# Patient Record
(demographics unavailable — no encounter records)

---

## 2024-10-22 NOTE — HISTORY OF PRESENT ILLNESS
[FreeTextEntry1] : This is a very nice  73 year old  female experiencing  pain in the left knee which is severe in intensity and has been going on for at least 3 months now. The pain limits activities of daily living. Walking tolerance is  reduced. The patient denies any radiation of the pain to the feet and it is not associated with numbness, tingling, or weakness.

## 2024-10-22 NOTE — PHYSICAL EXAM
[de-identified] : Well developed, well nourished in no apparent distress, awake, alert and orientated to person, place and time. with appropriate mood and affect.  Respirations are even and unlabored. Gait evaluation does reveal a limp. There is no inguinal adenopathy.  The affected limb is well-perfused, without skin lesions, shows a grossly normal motor and sensory examination.  Knee motion does cause pain. ROM of the knee is 0-110 degrees.  5 degrees varus The knee is stable within that range-of-motion to AP and ML stress.  Muscle strength is normal. Pedal pulses are palpable.

## 2024-10-22 NOTE — DISCUSSION/SUMMARY
[de-identified] :  This patient has  left knee osteoarthritis. I had a discussion with the patient, who is a candidate for joint replacement. Risks, benefits and alternatives were discussed. At this point, the patient wants to hold off until February. An extensive discussion was conducted on the natural history of the disease and the variety of surgical and non-surgical options available to the patient including, but not limited to non-steroidal anti-inflammatory medications, steroid injections, physical therapy, maintenance of ideal body weight, and reduction of activity. The patient will schedule an appointment as needed.  Informed consent for left knee injection  was obtained.  All risks, benefits and alternatives were discussed. These included but were not limited to bleeding, infection and allergic reaction. All questions were answered. A time out was performed. Left  knee were prepped and draped in sterile fashion. Using sterile technique, 40mg of Kenalog, 4cc of 1% lidocaine, 4cc of 0.25% marcaine using a 21-gauge needle. A sterile dressing was applied. Post injection instructions were reviewed. The patient tolerated the procedure well.   Her left knee is under Worker's Compensation. Follow up in 2-3 months.

## 2024-11-13 NOTE — REVIEW OF SYSTEMS
[Joint Pains] : arthralgias [Joint Swelling] : joint swelling  [Dry Skin] : ~L dry skin [Nl] : Genitourinary [FreeTextEntry4] : see HPI [FreeTextEntry9] : s/p total knee replacement surgery [de-identified] : see HPI

## 2024-11-13 NOTE — PHYSICAL EXAM
[Alert] : alert [Well Nourished] : well nourished [Healthy Appearance] : healthy appearance [No Acute Distress] : no acute distress [Well Developed] : well developed [Normal Pupil & Iris Size/Symmetry] : normal pupil and iris size and symmetry [No Discharge] : no discharge [No Photophobia] : no photophobia [Sclera Not Icteric] : sclera not icteric [Normal TMs] : both tympanic membranes were normal [Normal Nasal Mucosa] : the nasal mucosa was normal [Normal Lips/Tongue] : the lips and tongue were normal [Normal Outer Ear/Nose] : the ears and nose were normal in appearance [Normal Tonsils] : normal tonsils [No Thrush] : no thrush [Pale mucosa] : pale mucosa [Boggy Nasal Turbinates] : boggy and/or pale nasal turbinates [Supple] : the neck was supple [Normal Rate and Effort] : normal respiratory rhythm and effort [No Crackles] : no crackles [No Retractions] : no retractions [Bilateral Audible Breath Sounds] : bilateral audible breath sounds [Normal Rate] : heart rate was normal  [Normal S1, S2] : normal S1 and S2 [No murmur] : no murmur [Regular Rhythm] : with a regular rhythm [Soft] : abdomen soft [Not Tender] : non-tender [Not Distended] : not distended [No HSM] : no hepato-splenomegaly [Normal Cervical Lymph Nodes] : cervical [Skin Intact] : skin intact  [No Rash] : no rash [No Skin Lesions] : no skin lesions [No clubbing] : no clubbing [No Edema] : no edema [No Cyanosis] : no cyanosis [Normal Mood] : mood was normal [Normal Affect] : affect was normal [Alert, Awake, Oriented as Age-Appropriate] : alert, awake, oriented as age appropriate [Conjunctival Erythema] : no conjunctival erythema [Suborbital Bogginess] : no suborbital bogginess (allergic shiners) [Pharyngeal erythema] : no pharyngeal erythema [Posterior Pharyngeal Cobblestoning] : no posterior pharyngeal cobblestoning [Clear Rhinorrhea] : no clear rhinorrhea was seen [Exudate] : no exudate [Wheezing] : no wheezing was heard [Patches] : no patches [Urticaria] : no urticaria

## 2024-11-13 NOTE — CONSULT LETTER
[Dear  ___] : Dear  [unfilled], [Consult Letter:] : I had the pleasure of evaluating your patient, [unfilled]. [Please see my note below.] : Please see my note below. [Consult Closing:] : Thank you very much for allowing me to participate in the care of this patient.  If you have any questions, please do not hesitate to contact me. [Sincerely,] : Sincerely, [FreeTextEntry3] : MD Anu Trinidad MD [facility] [address] [address] phone: [phone] fax: [phone]

## 2024-11-13 NOTE — CONSULT LETTER
[Dear  ___] : Dear  [unfilled], [Consult Letter:] : I had the pleasure of evaluating your patient, [unfilled]. [Please see my note below.] : Please see my note below. [Consult Closing:] : Thank you very much for allowing me to participate in the care of this patient.  If you have any questions, please do not hesitate to contact me. [Sincerely,] : Sincerely, [FreeTextEntry3] : MD Anu Trinidad MD [facility] [address] [address] phone: [phone] fax: [phone]

## 2024-11-13 NOTE — HISTORY OF PRESENT ILLNESS
[de-identified] : 73-year old female presents for drug allergy evaluation to NSAIDs  Interval history 11/6/2024 Seen by dermatologist who thinks her lesions were due to sun exposure, diagnosing her with solar purpura/senile purpura Rheumatologic workup negative for any significant autoimmune disease including SLE Thinks she may have photosensitivity-induced lesions as her lesions have improved with reduced sun exposure after the summer Still has residual darkened wilbreto on the hand of previous lesion site, not itchy, self-resolved Took an Advil yesterday afternoon and an Aleve on 11/2 PM with no further episodes of rash Otherwise no allergic reactions since August Taking Allegra 6/7 days daily with Flonase daily, controlling her non-allergic rhinitis and chronic urticaria symptoms concern for the next surgery - worried about sulfa cross-reactivity of Celebrex with Bactrim given hx of anaphylaxis to Bactrim  ---------------------- Interval history 8/20/2024:  Was having fixed lesions on the L hand, middle of neck and upper L forearm after possible NSAID use. She states that it develops within 3-4 days of exposure, not itchy lesions After the first NSAID meloxicam x 2 weeks developed, then went away Then switched to diclofenac developed the same lesion after 3-4 days  Was in [place] 8/1-8/15 and applied salon pas methylsalicylate on L hip, developed recurrence of the lesion on her hand Started to slowly fade over the course of a week or so Had swollen legs in [place] and had to rent a scooter to get around using Lidocaine patches to help with her symptoms in addition to Tylenol stated she was diagnosed with bone marrow edema of the L Hip and bursitis Ortho recommends surgical intervention and possible hip replacement but due to possible NSAID reactions patient is uncomfortable as she has no good options for analgesia outside of tylenol PCP sent bloodwork for coagulation - normal PT/PTT/INR and no concern for LFT abnormalities CBC was obtained but not part of paperwork brought in by patient - pt was not informed of any abnormalities with WBC, Red cells or platelets  ---------- 01/2024 During her hospitalization in May 2023, the patient developed a rash about an hour after receiving Ketorolac and possibly ASA on 5/25. The rash resolved after treatment with Benadryl, Famotidine and Solumedrol. Worsening and recurrence of the rash as well as swelling of eyelids and lips occurred after administration of ASA, and again recurrence and generalization of the rash, itching of the throat and hoarseness after Tylenol 1000 mg IV. A serum tryptase was then sent which was elevated (15.7).  Ketorolac, ASA avoided. The patient was started on long acting oral antihistamines and Benadryl/Hydroxyzine, Medrol David, discharged home on Allegra 180 qAM and Cetirizine 10 mg QHS. The patient's rash improved, and per patient report resolved by 5/28.   Ancef (Cefazoliin) had been administered twice the day prior to her symptom onset, and considered a less likely culprit, however due to patient concerns, she RTC for skin testing to cefazolin.  The patient baseline tryptase from 6/5/23 was within normal range (3.5 ug/L), normalized from serum tryptase of 15.7 ug/L on 5/25 collected within 2 hours of symptoms of itching of the throat, hoarseness of the voice, and recurrence of diffuse urticaria during her hospitalization, most suggestive of a systemic allergic reaction. Patient had received Tylenol 1000 mg IV 2 hours prior, and ASA 5 hours prior.  Further laboratory testing from 6/5/23 was also remarkable for elevated anti-IgE receptor Ab suggestive of underlying chronic urticaria. Most recently patient had hives on 2 subsequent days in the setting of influenza infection 2 weeks ago.  Since then, the patient had passed supervised oral challenges to Ibuprofen 400 mg and Meloxicam 15 mg in our office. Skin prick and intradermal testing 7/24/23 was negative to Chlorhexidine.  She has h/o hives and swelling after taking Bactrim several decades ago, which has been avoided since then. After mole removal on ear lobe and eyelid, and application of Bacitracin, the patient developed swelling, several decades ago.

## 2024-11-13 NOTE — DATA REVIEWED
[FreeTextEntry1] : I reviewed external bloodwork from Dr. Navarro from 7/10/2024 PT - 10.2 sec INR - 0.89 PTT - 29.3 sec AST - 16U/L ALT - 21 U/L AlkP - 83 U/L

## 2024-11-13 NOTE — HISTORY OF PRESENT ILLNESS
[de-identified] : 73-year old female presents for drug allergy evaluation to NSAIDs  Interval history 11/6/2024 Seen by dermatologist who thinks her lesions were due to sun exposure, diagnosing her with solar purpura/senile purpura Rheumatologic workup negative for any significant autoimmune disease including SLE Thinks she may have photosensitivity-induced lesions as her lesions have improved with reduced sun exposure after the summer Still has residual darkened wilberto on the hand of previous lesion site, not itchy, self-resolved Took an Advil yesterday afternoon and an Aleve on 11/2 PM with no further episodes of rash Otherwise no allergic reactions since August Taking Allegra 6/7 days daily with Flonase daily, controlling her non-allergic rhinitis and chronic urticaria symptoms concern for the next surgery - worried about sulfa cross-reactivity of Celebrex with Bactrim given hx of anaphylaxis to Bactrim  ---------------------- Interval history 8/20/2024:  Was having fixed lesions on the L hand, middle of neck and upper L forearm after possible NSAID use. She states that it develops within 3-4 days of exposure, not itchy lesions After the first NSAID meloxicam x 2 weeks developed, then went away Then switched to diclofenac developed the same lesion after 3-4 days  Was in [place] 8/1-8/15 and applied salon pas methylsalicylate on L hip, developed recurrence of the lesion on her hand Started to slowly fade over the course of a week or so Had swollen legs in [place] and had to rent a scooter to get around using Lidocaine patches to help with her symptoms in addition to Tylenol stated she was diagnosed with bone marrow edema of the L Hip and bursitis Ortho recommends surgical intervention and possible hip replacement but due to possible NSAID reactions patient is uncomfortable as she has no good options for analgesia outside of tylenol PCP sent bloodwork for coagulation - normal PT/PTT/INR and no concern for LFT abnormalities CBC was obtained but not part of paperwork brought in by patient - pt was not informed of any abnormalities with WBC, Red cells or platelets  ---------- 01/2024 During her hospitalization in May 2023, the patient developed a rash about an hour after receiving Ketorolac and possibly ASA on 5/25. The rash resolved after treatment with Benadryl, Famotidine and Solumedrol. Worsening and recurrence of the rash as well as swelling of eyelids and lips occurred after administration of ASA, and again recurrence and generalization of the rash, itching of the throat and hoarseness after Tylenol 1000 mg IV. A serum tryptase was then sent which was elevated (15.7).  Ketorolac, ASA avoided. The patient was started on long acting oral antihistamines and Benadryl/Hydroxyzine, Medrol David, discharged home on Allegra 180 qAM and Cetirizine 10 mg QHS. The patient's rash improved, and per patient report resolved by 5/28.   Ancef (Cefazoliin) had been administered twice the day prior to her symptom onset, and considered a less likely culprit, however due to patient concerns, she RTC for skin testing to cefazolin.  The patient baseline tryptase from 6/5/23 was within normal range (3.5 ug/L), normalized from serum tryptase of 15.7 ug/L on 5/25 collected within 2 hours of symptoms of itching of the throat, hoarseness of the voice, and recurrence of diffuse urticaria during her hospitalization, most suggestive of a systemic allergic reaction. Patient had received Tylenol 1000 mg IV 2 hours prior, and ASA 5 hours prior.  Further laboratory testing from 6/5/23 was also remarkable for elevated anti-IgE receptor Ab suggestive of underlying chronic urticaria. Most recently patient had hives on 2 subsequent days in the setting of influenza infection 2 weeks ago.  Since then, the patient had passed supervised oral challenges to Ibuprofen 400 mg and Meloxicam 15 mg in our office. Skin prick and intradermal testing 7/24/23 was negative to Chlorhexidine.  She has h/o hives and swelling after taking Bactrim several decades ago, which has been avoided since then. After mole removal on ear lobe and eyelid, and application of Bacitracin, the patient developed swelling, several decades ago.

## 2024-11-13 NOTE — REVIEW OF SYSTEMS
[Joint Pains] : arthralgias [Joint Swelling] : joint swelling  [Dry Skin] : ~L dry skin [Nl] : Genitourinary [FreeTextEntry4] : see HPI [FreeTextEntry9] : s/p total knee replacement surgery [de-identified] : see HPI

## 2024-11-19 NOTE — DISCUSSION/SUMMARY
[de-identified] :  This patient has severe left knee osteoarthritis.  She has tried and failed a course of conservative management and is now considering proceeding with a left total knee arthroplasty using robotic navigation for assistance.  The patient is an appropriate candidate for consideration of left total knee replacement. An extensive discussion was conducted of the natural history of the disease and the variety of surgical and non-surgical treatment options available to the patient. A risk/benefit analysis was discussed with the patient reviewing the advantages and disadvantages of surgical intervention at this time. Both the level and length of the patient's pain have made additional conservative treatment measures consisting of NSAIDs, physical therapy, corticosteroids, and/or viscosupplementation contraindicated. A full explanation was given of the nature and the purpose of the procedure and anesthesia, its benefits, possible alternative methods of diagnosis or treatment, the risks involved, the possibility of complications, the foreseeable consequences of the procedure and the possible results of the non-treatment. I reviewed the plan of care as well as used a model of a total knee implant equivalent to the one that will be used for their total knee joint replacement. The ability to secure the implant utilizing cement or cementless (press-fit) was discussed with the patient. The patient agrees with the plan of care, as well as the use of implants for their total knee replacement.   We also discussed that if robotic/computer navigation is utilized, then additional incisions may need to be made to accommodate the computer navigation arrays, which will be placed in the femur and tibia.  No guarantee or assurance was made as to the results that may be obtained. Specifically, the risks were identified to include, but are not limited to the following: Infection, phlebitis, pulmonary embolism, death, paralysis, dislocation, pain, stiffness, instability, limp, weakness, breakage, leg-length inequality, uncontrolled bleeding, nerve injury, blood vessel injury, pressure sores, anesthetic risks, delayed healing of wound and bone, and wear and loosening. Further discussion was undertaken with the patient about the details of surgical preparation, treatment, and postoperative rehabilitation including medical clearance, autotransfusion, the hospital course, and the postoperative rehabilitation involved. All in all, I feel that this patient is a good candidate for surgical reconstruction.

## 2024-11-19 NOTE — HISTORY OF PRESENT ILLNESS
[FreeTextEntry1] : This is a very nice  73 year old  female experiencing  pain in the left knee which is severe in intensity and has been going on for at least 6 months now. The pain limits activities of daily living. Walking tolerance is  reduced. She has tried cortisone injections in the past. The patient denies any radiation of the pain to the feet and it is not associated with numbness, tingling, or weakness.

## 2024-11-19 NOTE — PHYSICAL EXAM
[de-identified] : Well developed, well nourished in no apparent distress, awake, alert and orientated to person, place and time. with appropriate mood and affect.  Respirations are even and unlabored. Gait evaluation does reveal a limp. There is no inguinal adenopathy.  The affected limb is well-perfused, without skin lesions, shows a grossly normal motor and sensory examination.  Knee motion does cause pain. ROM of the knee is 0-105 degrees.  5 degrees varus The knee is stable within that range-of-motion to AP and ML stress.  Muscle strength is normal. Pedal pulses are palpable. [de-identified] : Long standing knee, AP knee, lateral knee, and patellar views of the left knee were ordered and taken in the office and demonstrate degenerative joint disease of the knee with joint space narrowing, osteophyte formation, and subchondral sclerosis.

## 2024-12-13 NOTE — HISTORY OF PRESENT ILLNESS
[FreeTextEntry1] : She has a hx. of HTN.   Echo in Jan. 2023 showed only minimal MR and mild diastolic LV dysfunction; LV EF was 72%.  Her father suffered from premature CAD with an initial MI at age 40.   In 2023, she had a knee replacement.  She developed a rash approx. 1 hour after receiving ketorolac for pain in the recovery room post-op.  The rash appeared to worsen and was associated with swelling of the eyelids and lips after administration of aspirin.  She was seen by allergy and immunology and treated with Benadryl, hydroxyzine and a Medrol Dosepak; she was discharged on Allegra and cetirizine.  Her rash improved over time.  She saw the allergy and immunology service in follow-up; it was thought likely that NSAIDs/aspirin was the cause of the allergic reaction.  She was also told to avoid cephalosporins for concern of a possible delayed hypersensitivity reaction, as a dose of cefazolin had been given 2 days prior to the allergic reaction.  She continues to follow-up with the allergy and immunology service.  As she returns today, she continues to have orthopedic problems.  In addition to her knee, she is having considerable hip pain.  After discussion with two orthopedists, she will likely need to have the hip replaced before having the knee replaced.  In August, she travel to [place] with her daughter.  The weather was very hot.  She recalls 1 specific instance, when walking in the heat when she experienced a anterior squeezing chest discomfort, which resolved when she had a chance to rest and a cool place.  She does not describe recurrence, but her daughter says it happened more than once while they were away.  However, since returning home, she tells me that she has remained asymptomatic.  She does not describe exertional dyspnea or palpitations.

## 2024-12-13 NOTE — ASSESSMENT
[FreeTextEntry1] : =================================== Hypertension  Borderline HLD  Familial heart dz. with premature CAD in father  Osteoarthritis of both knees and of the lumbo-sacral spine  Allergy to NSAIDs/ASA; hx. of allergy to Bactrim. Possible allergy to cephalosporins.

## 2024-12-13 NOTE — PHYSICAL EXAM
[General Appearance - Well Developed] : well developed [Normal Appearance] : normal appearance [Well Groomed] : well groomed [General Appearance - Well Nourished] : well nourished [General Appearance - In No Acute Distress] : no acute distress [Normal Conjunctiva] : the conjunctiva exhibited no abnormalities [Eyelids - No Xanthelasma] : the eyelids demonstrated no xanthelasmas [Normal Jugular Venous A Waves Present] : normal jugular venous A waves present [Normal Jugular Venous V Waves Present] : normal jugular venous V waves present [No Jugular Venous Gautam A Waves] : no jugular venous gautam A waves [] : no respiratory distress [Respiration, Rhythm And Depth] : normal respiratory rhythm and effort [Auscultation Breath Sounds / Voice Sounds] : lungs were clear to auscultation bilaterally [Heart Rate And Rhythm] : heart rate and rhythm were normal [Bowel Sounds] : normal bowel sounds [Abnormal Walk] : normal gait [Nail Clubbing] : no clubbing of the fingernails [Cyanosis, Localized] : no localized cyanosis [Skin Color & Pigmentation] : normal skin color and pigmentation [Skin Turgor] : normal skin turgor [Oriented To Time, Place, And Person] : oriented to person, place, and time [Impaired Insight] : insight and judgment were intact [Affect] : the affect was normal [Mood] : the mood was normal [FreeTextEntry1] : Spider varicosities of the lower extremities.

## 2024-12-13 NOTE — HISTORY OF PRESENT ILLNESS
[FreeTextEntry1] : She has a hx. of HTN.   Echo in Jan. 2023 showed only minimal MR and mild diastolic LV dysfunction; LV EF was 72%.  Her father suffered from premature CAD with an initial MI at age 40.   In 2023, she had a knee replacement.  She developed a rash approx. 1 hour after receiving ketorolac for pain in the recovery room post-op.  The rash appeared to worsen and was associated with swelling of the eyelids and lips after administration of aspirin.  She was seen by allergy and immunology and treated with Benadryl, hydroxyzine and a Medrol Dosepak; she was discharged on Allegra and cetirizine.  Her rash improved over time.  She saw the allergy and immunology service in follow-up; it was thought likely that NSAIDs/aspirin was the cause of the allergic reaction.  She was also told to avoid cephalosporins for concern of a possible delayed hypersensitivity reaction, as a dose of cefazolin had been given 2 days prior to the allergic reaction.  She continues to follow-up with the allergy and immunology service.  As she returns today, she continues to have orthopedic problems.  In addition to her knee, she is having considerable hip pain.  After discussion with two orthopedists, she will likely need to have the hip replaced before having the knee replaced.  In August, she travel to [place] with her daughter.  The weather was very hot.  She recalls 1 specific instance, when walking in the heat when she experienced a anterior squeezing chest discomfort, which resolved when she had a chance to rest and a cool place.  She does not describe recurrence, but her daughter says it happened more than once while they were away.  However, since returning home, she tells me that she has remained asymptomatic.  She does not describe exertional dyspnea or palpitations.

## 2024-12-13 NOTE — DISCUSSION/SUMMARY
[EKG obtained to assist in diagnosis and management of assessed problem(s)] : EKG obtained to assist in diagnosis and management of assessed problem(s) [FreeTextEntry1] : EKG today shows:  Sinus Rhythm at 92 bpm.  Normal intervals and axis.  Unremarkable tracing; no change.  PLAN: 1.  HTN  -blood pressure remains in acceptable range today. - continue enalapril   - continue low salt diet.  2.  Borderline HLD - continue low fat diet.  3.  Episodes of CP last August, no apparent recurrence. -   Given risk factors for heart disease, notably premature heart disease in her father, we will arrange for ischemic testing particular given need for orthopedic surgery in the foreseeable future.  Will consider either cardiac CT angiography or potentially pharmacologic nuclear stress testing.  33 minutes spent on today's office visit.   She will return in 4 mos.

## 2025-01-07 NOTE — HISTORY OF PRESENT ILLNESS
[de-identified] : This is very nice 73-year-old female experiencing left hip and groin and thigh pain, which is severe in intensity.  Known left hip osteoarthritis.  The pain substantially limits activities of daily living. Walking tolerance is reduced. Medication including intra-articular hip cortisone injection and physical therapy and activity modification have been minimally effective for a period lasting greater than three months in duration. Assistive devices and external support were not deemed by the patient to be helpful in improving their function. Due to the severity of osteoarthritis and level of pain, physical therapy is contraindicated. Pain and restriction of function are intolerable at this time. The patient denies any radiation of the pain to the feet and it is not associated with numbness, tingling, or weakness.

## 2025-01-07 NOTE — DISCUSSION/SUMMARY
[de-identified] : This patient is severe left hip osteoarthritis.  She has tried and failed a course of conservative management and would like to proceed with a direct anterior approach left total of arthroplasty.  The patient is an appropriate candidate for consideration of left total hip replacement. An extensive discussion was conducted of the natural history of the disease and the variety of surgical and non-surgical treatment options available to the patient. A risk/benefit analysis was discussed with the patient reviewing the advantages and disadvantages of surgical intervention at this time. Both the level and length of the patient's pain have made additional conservative treatment measures consisting of NSAIDs, physical therapy, and/or corticosteroids contraindicated. A full explanation was given of the nature and the purpose of the procedure and anesthesia, its benefits, possible alternative methods of diagnosis or treatment, the risks involved, the possibility of complications, the foreseeable consequences of the procedure and the possible results of the non-treatment. I reviewed the plan of care as well as used a model of a total hip implant equivalent to the one that will be used for their total hip joint replacement. The ability to secure the implant utilizing cement or cementless (press-fit) was discussed with the patient. The patient agrees with the plan of care, as well as the use of implants for their total hip replacement.   No guarantee or assurance was made as to the results that may be obtained. Specifically, the risks were identified to include, but are not limited to the following: Infection, phlebitis, pulmonary embolism, death, paralysis, dislocation, pain, stiffness, instability, limp, weakness, breakage, leg-length inequality, uncontrolled bleeding, nerve injury, blood vessel injury, pressure sores, anesthetic risks, delayed healing of wound and bone, and wear and loosening. Further discussion was undertaken with the patient about the details of surgical preparation, treatment, and postoperative rehabilitation including medical clearance, autotransfusion, the hospital course, and the postoperative rehabilitation involved. All in all, I feel that this patient is a good candidate for surgical reconstruction.

## 2025-01-07 NOTE — PHYSICAL EXAM
[de-identified] : Patient is well nourished, well-developed, in no acute distress, with appropriate mood and affect. The patient is oriented to time, place, and person. Respirations are even and unlabored. Gait evaluation reveals a limp. There is no inguinal adenopathy. Examination of the contralateral hip shows normal range of motion, strength, no tenderness, and intact skin. The affected limb is well-perfused, shows a grossly normal motor and sensory examination. Examination of the hip shows no skin lesions. Hip motion is reduced and causes pain. FADIR is positive and YAMINI is positive. Stinchfield test is positive. Leg lengths are approximately 1 cm shorter than the left. Both hips are stable and muscle strength is normal. Pedal pulses are palpable. [de-identified] :  AP pelvis, AP and lateral hip radiographs of the left hip were ordered and taken in the office and demonstrate degenerative joint disease of the hip with joint space narrowing, osteophyte formation, and subchondral sclerosis.

## 2025-01-16 NOTE — HISTORY OF PRESENT ILLNESS
[___ Month(s) Ago] : [unfilled] month(s) ago [FreeTextEntry1] : 1/2025: being considered for hip replacement surgery due to OA;  otherwise, she denies other joint pains. Denies joint swelling.

## 2025-01-16 NOTE — ASSESSMENT
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 knee replacement soon too but reports anaphylaxis after the R. knee surgery), chronic urticaria, here for followup of joint pain. Was referred to rheum by her orthopedist due to finding of edema in L. hip. She has hip osteoarthritis and orthopedist recommended hip replacement for the patient.   plan:  Will repeat labs today including ESR, CRP.  At this time I am not concerned for any inflammatory arthritis. She was advised to follow up with orthopedist for her ongoing hip pain especially if labs are unremarkable at today's visit.

## 2025-01-16 NOTE — PHYSICAL EXAM
[General Appearance - Alert] : alert [General Appearance - In No Acute Distress] : in no acute distress [Sclera] : the sclera and conjunctiva were normal [Extraocular Movements] : extraocular movements were intact [Outer Ear] : the ears and nose were normal in appearance [Neck Appearance] : the appearance of the neck was normal [Exaggerated Use Of Accessory Muscles For Inspiration] : no accessory muscle use [Edema] : there was no peripheral edema [Musculoskeletal - Swelling] : no joint swelling seen [FreeTextEntry1] : no joint tenderness or synovitis; OA changes to L. knee; s/p R knee replacement. No hip trochanteric bursitis.  [Skin Color & Pigmentation] : normal skin color and pigmentation [] : no rash [Skin Lesions] : no skin lesions [No Focal Deficits] : no focal deficits [Oriented To Time, Place, And Person] : oriented to person, place, and time [Affect] : the affect was normal [Mood] : the mood was normal

## 2025-02-10 NOTE — HISTORY OF PRESENT ILLNESS
[FreeTextEntry1] : She has a hx. of HTN.   Echo in Jan. 2023 showed only minimal MR and mild diastolic LV dysfunction; LV EF was 72%.  Her father suffered from premature CAD with an initial MI at age 40.  In 2023, she had a knee replacement.  She developed a rash approx. 1 hour after receiving ketorolac for pain in the recovery room post-op.  The rash appeared to worsen and was associated with swelling of the eyelids and lips after administration of aspirin.  She was seen by allergy and immunology and treated with Benadryl, hydroxyzine and a Medrol Dosepak; she was discharged on Allegra and cetirizine.  Her rash improved over time.  She saw the allergy and immunology service in follow-up; it was thought likely that NSAIDs/aspirin was the cause of the allergic reaction.  She was also told to avoid cephalosporins for concern of a possible delayed hypersensitivity reaction, as a dose of cefazolin had been given 2 days prior to the allergic reaction.  She continues to follow-up with the allergy and immunology service.  12/13/24: As she returns today, she continues to have orthopedic problems.  In addition to her knee, she is having considerable hip pain.  After discussion with two orthopedists, she will likely need to have the hip replaced before having the knee replaced. In August, she travelled to [place] with her daughter.  The weather was very hot.  She recalls 1 specific instance, when walking in the heat when she experienced a anterior squeezing chest discomfort, which resolved when she had a chance to rest and a cool place.  She does not describe recurrence, but her daughter says it happened more than once while they were away.  However, since returning home, she tells me that she has remained asymptomatic.  She does not describe any exertional dyspnea or palpitations.  2/10/25: She is scheduled to have hip replacement surgery early next month.  From a cardiac standpoint, she has been well.  With her activities, she reports no exertional chest discomfort and describes no exertional dyspnea.  There have been no palpitations. Cardiac CTA on 1/24/25 showed a calcium score of 54 and minimal CAD with no stenosis. Cardiac CTA on 1/24/25 showed a calcium score of 54 and minimal CAD with no stenosis. Cardiac CTA on 1/24/25 showed a calcium score of 54 and minimal CAD with no stenosis.

## 2025-02-10 NOTE — REASON FOR VISIT
[FreeTextEntry1] :   Hanna Ivanna returns for f/u re HTN and prior to hip replacement in early March.

## 2025-02-10 NOTE — DISCUSSION/SUMMARY
[EKG obtained to assist in diagnosis and management of assessed problem(s)] : EKG obtained to assist in diagnosis and management of assessed problem(s) [FreeTextEntry1] : EKG today shows:  Sinus Rhythm at 81 bpm.  Normal intervals and axis.  Unremarkable tracing; no change.  PLAN: 1.  HTN  - blood pressure in normal range today. - continue enalapril   - continue low salt diet.  2.  Borderline HLD - continue low fat diet. -  discussed benefit of weight loss for improvement of lipid profile..  Atypical CP; cardiac CTA with no significant abnormalities  3.   I reviewed the result of the recent cardiac CT angiogram with her.  The findings were favorable, with a relatively low calcium score and no evidence of any coronary stenoses.  4.   There is no cardiac contraindication to her planned hip replacement surgery.  41 minutes spent on today's office visit.   She will return for a f/u visit in 6 mos.

## 2025-02-10 NOTE — ASSESSMENT
[FreeTextEntry1] : =================================== Hypertension  Familial heart dz. with premature CAD in father  Osteoarthritis of both knees and of the lumbo-sacral spine  Allergy to NSAIDs/ASA; hx. of allergy to Bactrim. Possible allergy to cephalosporins.

## 2025-02-10 NOTE — HISTORY OF PRESENT ILLNESS
[FreeTextEntry1] : She has a hx. of HTN.   Echo in Jan. 2023 showed only minimal MR and mild diastolic LV dysfunction; LV EF was 72%.  Her father suffered from premature CAD with an initial MI at age 40.  In 2023, she had a knee replacement.  She developed a rash approx. 1 hour after receiving ketorolac for pain in the recovery room post-op.  The rash appeared to worsen and was associated with swelling of the eyelids and lips after administration of aspirin.  She was seen by allergy and immunology and treated with Benadryl, hydroxyzine and a Medrol Dosepak; she was discharged on Allegra and cetirizine.  Her rash improved over time.  She saw the allergy and immunology service in follow-up; it was thought likely that NSAIDs/aspirin was the cause of the allergic reaction.  She was also told to avoid cephalosporins for concern of a possible delayed hypersensitivity reaction, as a dose of cefazolin had been given 2 days prior to the allergic reaction.  She continues to follow-up with the allergy and immunology service.  12/13/24: As she returns today, she continues to have orthopedic problems.  In addition to her knee, she is having considerable hip pain.  After discussion with two orthopedists, she will likely need to have the hip replaced before having the knee replaced. In August, she travelled to [place] with her daughter.  The weather was very hot.  She recalls 1 specific instance, when walking in the heat when she experienced a anterior squeezing chest discomfort, which resolved when she had a chance to rest and a cool place.  She does not describe recurrence, but her daughter says it happened more than once while they were away.  However, since returning home, she tells me that she has remained asymptomatic.  She does not describe any exertional dyspnea or palpitations.  2/10/25: She is scheduled to have hip replacement surgery early next month.  From a cardiac standpoint, she has been well.  With her activities, she reports no exertional chest discomfort and describes no exertional dyspnea.  There have been no palpitations. Cardiac CTA on 1/24/25 showed a calcium score of 54 and minimal CAD with no stenosis. Cardiac CTA on 1/24/25 showed a calcium score of 54 and minimal CAD with no stenosis. Cardiac CTA on 1/24/25 showed a calcium score of 54 and minimal CAD with no stenosis.

## 2025-03-21 NOTE — HISTORY OF PRESENT ILLNESS
[de-identified] : Status-post left total hip  arthroplasty here for initial postoperative evaluation. Excellent progress is noted in terms of pain and restoration of function. Pain is well controlled with oral medications. There has been no change in medical health since discharge. The patient does require assistive devices.

## 2025-03-21 NOTE — PHYSICAL EXAM
[de-identified] : Well developed, well nourished in no apparent distress, awake, alert and orientated to person, place and time with appropriate mood and affect Respirations are even and unlabored. Gait evaluation does not reveal a limp. There is no inguinal adenopathy. The affected limb is well-perfused with palpable pedal pulse, without skin lesions, shows a grossly normal motor and sensory examination. Incision is CDI Hip motion is full and painless throughout ROM. Leg lengths are approximately equal  [de-identified] : AP pelvis, AP hip, and lateral x-rays of the left hip were ordered and obtained in the office and demonstrate satisfactory position and alignment of the components are present. No signs of loosening are seen.

## 2025-03-21 NOTE — DISCUSSION/SUMMARY
[de-identified] : The patient is doing well after joint replacement surgery. Written infectious precautions were reviewed. The patient will progress with physical therapy at this time and they will work on transitioning from requiring assistive devices for ambulation. Anti-coagulant therapy will be discontinued at 1 month post surgery for the purpose of orthopedic thromboembolism prophylaxis. Return around the 6 week anniversary from surgery for follow-up evaluation.

## 2025-03-26 NOTE — CONSULT LETTER
[Dear  ___] : Dear  [unfilled], [Consult Letter:] : I had the pleasure of evaluating your patient, [unfilled]. [FreeTextEntry1] : The patient was seen in hand consultation today. A copy of my office note is enclosed for your review with the patient's knowledge and consent.  Sincerely,  Michel Moulton MD Chief, Hand Surgery Residency  ([phone]) Department of Orthopaedic Surgery  [facility] Professor of Orthopaedic Surgery Giselle MERIDA at [facility], [place]

## 2025-03-26 NOTE — PHYSICAL EXAM
[de-identified] : Left wrist E/F 50 degrees / 50 degrees without obvious wrist pain. First extensor compartment swelling. Marked tenderness first compartment recreating symptoms. Resisted thumb abduction extension with wrist position in flexion recreates symptoms. Finkelstein test mildly positive Wrist joint manipulation no pain Basal joint enlargement with stiffness. Basal joint manipulation minimal motion no crepitus no obvious pain.  Left hand No A1 pulley tenderness and no triggering in any finger. No pertinent MP, PIP, or DIP joint contributory findings, except some Heberden's nodes; none are clinically painful.  Right wrist E/F 50/50 degrees without pain First compartment and other wrist extensor compartments nontender, no swelling. Finkelstein negative. Basal joint prominent with adduction, stiffness, no crepitus, no pain with manipulation.  Right hand No A1 pulley tenderness and no triggering in any finger. No pertinent MP, PIP, or DIP joint contributory findings, except some Heberden's nodes; none are clinically painful.  Neurologic: Median, ulnar, and radial motor and sensory are intact.  Skin: No cyanosis, clubbing, or rashes. Vascular: Radial pulses intact. Lymphatic: No streaking or epitrochlear adenopathy. The patient is awake, alert, and oriented. Affect appropriate. Cooperative.

## 2025-03-26 NOTE — PHYSICAL EXAM
[de-identified] : Left wrist E/F 50 degrees / 50 degrees without obvious wrist pain. First extensor compartment swelling. Marked tenderness first compartment recreating symptoms. Resisted thumb abduction extension with wrist position in flexion recreates symptoms. Finkelstein test mildly positive Wrist joint manipulation no pain Basal joint enlargement with stiffness. Basal joint manipulation minimal motion no crepitus no obvious pain.  Left hand No A1 pulley tenderness and no triggering in any finger. No pertinent MP, PIP, or DIP joint contributory findings, except some Heberden's nodes; none are clinically painful.  Right wrist E/F 50/50 degrees without pain First compartment and other wrist extensor compartments nontender, no swelling. Finkelstein negative. Basal joint prominent with adduction, stiffness, no crepitus, no pain with manipulation.  Right hand No A1 pulley tenderness and no triggering in any finger. No pertinent MP, PIP, or DIP joint contributory findings, except some Heberden's nodes; none are clinically painful.  Neurologic: Median, ulnar, and radial motor and sensory are intact.  Skin: No cyanosis, clubbing, or rashes. Vascular: Radial pulses intact. Lymphatic: No streaking or epitrochlear adenopathy. The patient is awake, alert, and oriented. Affect appropriate. Cooperative.

## 2025-03-26 NOTE — CONSULT LETTER
[Dear  ___] : Dear  [unfilled], [Consult Letter:] : I had the pleasure of evaluating your patient, [unfilled]. [FreeTextEntry1] : The patient was seen in hand consultation today. A copy of my office note is enclosed for your review with the patient's knowledge and consent.  Sincerely,  Michel Moulton MD Chief, Hand Surgery Residency  ([phone]) Department of Orthopaedic Surgery  [facility] Professor of Orthopaedic Surgery Giselle MERIDA at [facility]

## 2025-03-26 NOTE — ASSESSMENT
[FreeTextEntry1] : The patient has left wrist pain.  There is tenderness and swelling first compartment and history and physical findings are consistent with de Quervain's tendinitis left wrist.  Patient reports that she had a left wrist cortisone injection "for the same thing" approximately 1 year ago and was relieved of symptoms until 2 months ago.  Patient was under the impression that she had de Quervain's tendinitis.  Patient has been wearing an OTC thumb spica device that has been somewhat helpful, but which does not control pain.  Because of oral NSAID sensitivity, patient has not taken any medication or tried any other treatment for this condition. Patient has osteoarthritis clinically of basal joints and multiple DIP joints but these are nonpainful. The patient underwent left COLT 3 weeks ago.  The left wrist pain preexisted the surgery and has not been notably affected directly by the surgery. I reviewed treatment options risks and complications.  I have advised patient that other than surgery first compartment cortisone injection will provide her with the best chance for the most amount of improvement with the possibility of symptoms subsiding.  The statistical chances of resolution versus recurrence, and the statistics regarding the possibility of additional injections were discussed with the patient. Following this discussion patient requested and was treated with left wrist first compartment cortisone injection without complication.  Prognosis uncertain. Patient should return if symptoms continue or if symptoms recur. Thumb spica splint is not required but optional if patient would like to use it. I discussed application of topical diclofenac gel which can be effective for basal joint arthritis but not typically effective for de Quervain's tendinitis and therefore not recommended. Patient has osteoarthritis of basal joints in multiple small joints which appear not to be notably symptomatic and do not require treatment at this time.  A lengthy and detailed discussion was held with the patient regarding analysis, treatment, and recommendations. All questions have been answered. At the conclusion the patient expressed acceptance, understanding and agreement with the plan.

## 2025-03-26 NOTE — HISTORY OF PRESENT ILLNESS
[FreeTextEntry1] : The patient is 72 yo RHD female under care by Giblert Nazario MD following left COLT and right TKR. Patient also has additional musculoskeletal history of bilateral knee arthritis, chronic back pain. Left COLT 3 weeks ago. Pt uses cane in right hand.  TODAY: Patient presents as a NEW HAND patient for evaluation.   The patient has left wrist pain "on and off for years". Pain comes and goes. Pt can't take po NSAIDs. Patient had cortisone injection left wrist presumably for de Quervain's tendinitis 1 year ago. Patient was relieved of symptoms until 10 months ago. Approximately 2 months ago the patient developed pain over the radial aspect of left wrist.  Patient had left COLT.  The pain of the left wrist has continued and is exacerbated with certain activities primarily using left thumb and certain positions of the left wrist with thumb activities. Patient has a OTC thumb spica orthosis which provides a little benefit. Patient is unable to take oral NSAIDs. Patient has not tried topical NSAID such as Voltaren gel.

## 2025-03-26 NOTE — HISTORY OF PRESENT ILLNESS
[FreeTextEntry1] : The patient is 74 yo RHD female under care by Gilbert Nazario MD following left COLT and right TKR. Patient also has additional musculoskeletal history of bilateral knee arthritis, chronic back pain. Left COLT 3 weeks ago. Pt uses cane in right hand.  TODAY: Patient presents as a NEW HAND patient for evaluation.   The patient has left wrist pain "on and off for years". Pain comes and goes. Pt can't take po NSAIDs. Patient had cortisone injection left wrist presumably for de Quervain's tendinitis 1 year ago. Patient was relieved of symptoms until 10 months ago. Approximately 2 months ago the patient developed pain over the radial aspect of left wrist.  Patient had left COLT.  The pain of the left wrist has continued and is exacerbated with certain activities primarily using left thumb and certain positions of the left wrist with thumb activities. Patient has a OTC thumb spica orthosis which provides a little benefit. Patient is unable to take oral NSAIDs. Patient has not tried topical NSAID such as Voltaren gel.

## 2025-04-24 NOTE — DISCUSSION/SUMMARY
[de-identified] : The patient is doing well after joint replacement surgery. WBAT. Return around the 6 month anniversary from surgery for follow-up evaluation.

## 2025-04-24 NOTE — HISTORY OF PRESENT ILLNESS
[de-identified] : Status-post left total hip  arthroplasty here for routine postoperative evaluation. Excellent progress is noted in terms of pain and restoration of function. Pain is well controlled with oral medications. There has been no change in medical health since discharge. The patient does not require assistive devices.

## 2025-05-02 NOTE — HISTORY OF PRESENT ILLNESS
[FreeTextEntry1] : This is a very nice  73 year old  female experiencing  pain in the left knee which is severe in intensity and has been going on chronically. Known left knee oa. The pain limits activities of daily living. Walking tolerance is  reduced. The patient denies any radiation of the pain to the feet and it is not associated with numbness, tingling, or weakness.

## 2025-05-02 NOTE — DISCUSSION/SUMMARY
[de-identified] :  This patient has  left knee osteoarthritis. I had a discussion with the patient, who is a candidate for joint replacement. Risks, benefits and alternatives were discussed. At this point, the patient wants to hold off until February. An extensive discussion was conducted on the natural history of the disease and the variety of surgical and non-surgical options available to the patient including, but not limited to non-steroidal anti-inflammatory medications, steroid injections, physical therapy, maintenance of ideal body weight, and reduction of activity. The patient will schedule an appointment as needed.  Informed consent for left knee injection  was obtained.  All risks, benefits and alternatives were discussed. These included but were not limited to bleeding, infection and allergic reaction. All questions were answered. A time out was performed. Left  knee were prepped and draped in sterile fashion. Using sterile technique, 40mg of Kenalog, 4cc of 1% lidocaine, 4cc of 0.25% marcaine using a 21-gauge needle. A sterile dressing was applied. Post injection instructions were reviewed. The patient tolerated the procedure well.   Her left knee is under Worker's Compensation. Follow up in 2-3 months.

## 2025-05-02 NOTE — PHYSICAL EXAM
[de-identified] : Well developed, well nourished in no apparent distress, awake, alert and orientated to person, place and time. with appropriate mood and affect.  Respirations are even and unlabored. Gait evaluation does reveal a limp. There is no inguinal adenopathy.  The affected limb is well-perfused, without skin lesions, shows a grossly normal motor and sensory examination.  Knee motion does cause pain. ROM of the knee is 0-110 degrees.  5 degrees varus The knee is stable within that range-of-motion to AP and ML stress.  Muscle strength is normal. Pedal pulses are palpable.

## 2025-07-30 NOTE — PHYSICAL EXAM
[de-identified] : Well developed, well nourished in no apparent distress, awake, alert and orientated to person, place and time. with appropriate mood and affect.  Respirations are even and unlabored. Gait evaluation does reveal a limp. There is no inguinal adenopathy.  The affected limb is well-perfused, without skin lesions, shows a grossly normal motor and sensory examination.  Knee motion does cause pain. ROM of the knee is 0-105 degrees.  5 degrees valgus The knee is stable within that range-of-motion to AP and ML stress.  Muscle strength is normal. Pedal pulses are palpable. [de-identified] : Long standing knee, AP knee, lateral knee, and patellar views of the left knee were ordered and taken in the office and demonstrate degenerative joint disease of the knee with joint space narrowing, osteophyte formation, and subchondral sclerosis.

## 2025-07-30 NOTE — DISCUSSION/SUMMARY
[de-identified] : This patient has severe left knee osteoarthritis.  She has tried and failed a course of conservative management and elected proceed with a left total knee arthroplasty using robotic navigation for assistance.  In the meantime today we performed a left knee intra-articular cortisone injection.  Informed consent for left knee injection  was obtained.  All risks, benefits and alternatives were discussed. These included but were not limited to bleeding, infection and allergic reaction. All questions were answered. A time out was performed. Left  knee were prepped and draped in sterile fashion. Using sterile technique, 40mg of Kenalog, 4cc of 1% lidocaine, 4cc of 0.25% marcaine using a 21-gauge needle. A sterile dressing was applied. Post injection instructions were reviewed. The patient tolerated the procedure well.   The patient is an appropriate candidate for consideration of left total knee replacement. An extensive discussion was conducted of the natural history of the disease and the variety of surgical and non-surgical treatment options available to the patient. A risk/benefit analysis was discussed with the patient reviewing the advantages and disadvantages of surgical intervention at this time. Both the level and length of the patient's pain have made additional conservative treatment measures consisting of NSAIDs, physical therapy, corticosteroids, and/or viscosupplementation contraindicated. A full explanation was given of the nature and the purpose of the procedure and anesthesia, its benefits, possible alternative methods of diagnosis or treatment, the risks involved, the possibility of complications, the foreseeable consequences of the procedure and the possible results of the non-treatment. I reviewed the plan of care as well as used a model of a total knee implant equivalent to the one that will be used for their total knee joint replacement. The ability to secure the implant utilizing cement or cementless (press-fit) was discussed with the patient. The patient agrees with the plan of care, as well as the use of implants for their total knee replacement.   We also discussed that if robotic/computer navigation is utilized, then additional incisions may need to be made to accommodate the computer navigation arrays, which will be placed in the femur and tibia.  No guarantee or assurance was made as to the results that may be obtained. Specifically, the risks were identified to include, but are not limited to the following: Infection, phlebitis, pulmonary embolism, death, paralysis, dislocation, pain, stiffness, instability, limp, weakness, breakage, leg-length inequality, uncontrolled bleeding, nerve injury, blood vessel injury, pressure sores, anesthetic risks, delayed healing of wound and bone, and wear and loosening. Further discussion was undertaken with the patient about the details of surgical preparation, treatment, and postoperative rehabilitation including medical clearance, autotransfusion, the hospital course, and the postoperative rehabilitation involved. All in all, I feel that this patient is a good candidate for surgical reconstruction.

## 2025-07-30 NOTE — HISTORY OF PRESENT ILLNESS
[FreeTextEntry1] : This is a very nice  73 year old  female experiencing  pain in the left knee which is severe in intensity and has been going on chronically for >6 months. Known left knee oa. The pain limits activities of daily living. Walking tolerance is  reduced. The patient denies any radiation of the pain to the feet and it is not associated with numbness, tingling, or weakness. Prior CSI did help.